# Patient Record
Sex: MALE | Race: BLACK OR AFRICAN AMERICAN | NOT HISPANIC OR LATINO | URBAN - METROPOLITAN AREA
[De-identification: names, ages, dates, MRNs, and addresses within clinical notes are randomized per-mention and may not be internally consistent; named-entity substitution may affect disease eponyms.]

---

## 2021-01-04 ENCOUNTER — OFFICE VISIT (OUTPATIENT)
Dept: URBAN - METROPOLITAN AREA CLINIC 25 | Facility: CLINIC | Age: 31
End: 2021-01-04

## 2021-01-11 ENCOUNTER — OFFICE VISIT (OUTPATIENT)
Dept: URBAN - METROPOLITAN AREA CLINIC 25 | Facility: CLINIC | Age: 31
End: 2021-01-11

## 2021-01-11 RX ORDER — LINACLOTIDE 290 UG/1
TAKE 1 CAPSULE DAILY ON AN EMPTY STOMACH AT LEAST 30 MINUTES BEFORE 1ST MEAL OF THE DAY FOR 90 DAYS CAPSULE, GELATIN COATED ORAL
Qty: 90 | Refills: 2 | Status: ACTIVE | COMMUNITY
Start: 2020-01-03 | End: 1900-01-01

## 2021-01-11 NOTE — HPI-OTHER HISTORIES
Jan 2020 -  On linzess 290 mg. No new medications. Reports bloating. No abdominal pain. Constipation improved with linzess.  June 2019 -  Has been on linzess 290 mcg despite which has constipation. No rectal bleeding, stable weight.  Linzess definitely working but not always. Takes 2-3 capsules on some days.   OCT 2018 -  March 2018 Colonoscopy: somewhat suboptimal prep but normal otherwise, was prescribed Linzess 145 mcg daily, repeat colonoscopy advised at age 45 for screening.  Patient reports irregular bowel habits. He has been taking two Linzess 145 mcg daily. He denies blood in stool.     3/12/18 Longstanding chronic constipation refractory to OTC laxatives. No family history of GI malignancy. No prior colonoscopy. No rectal bleeding. No unintentional weight loss. No abdominal pain.

## 2021-07-19 ENCOUNTER — OFFICE VISIT (OUTPATIENT)
Dept: URBAN - METROPOLITAN AREA CLINIC 124 | Facility: CLINIC | Age: 31
End: 2021-07-19

## 2021-07-19 PROBLEM — 10743008 IRRITABLE BOWEL SYNDROME: Status: ACTIVE | Noted: 2021-01-11

## 2021-07-19 PROBLEM — 35298007 SLOW TRANSIT CONSTIPATION: Status: ACTIVE | Noted: 2021-01-11

## 2021-07-19 RX ORDER — LINACLOTIDE 290 UG/1
TAKE 1 CAPSULE DAILY ON AN EMPTY STOMACH AT LEAST 30 MINUTES BEFORE 1ST MEAL OF THE DAY FOR 90 DAYS CAPSULE, GELATIN COATED ORAL
Qty: 90 | Refills: 2 | Status: ACTIVE | COMMUNITY
Start: 2020-01-03 | End: 1900-01-01